# Patient Record
Sex: FEMALE | Race: OTHER | HISPANIC OR LATINO
[De-identification: names, ages, dates, MRNs, and addresses within clinical notes are randomized per-mention and may not be internally consistent; named-entity substitution may affect disease eponyms.]

---

## 2019-06-04 ENCOUNTER — RESULT REVIEW (OUTPATIENT)
Age: 54
End: 2019-06-04

## 2021-07-13 PROBLEM — Z00.00 ENCOUNTER FOR PREVENTIVE HEALTH EXAMINATION: Status: ACTIVE | Noted: 2021-07-13

## 2021-07-16 ENCOUNTER — NON-APPOINTMENT (OUTPATIENT)
Age: 56
End: 2021-07-16

## 2021-07-16 ENCOUNTER — APPOINTMENT (OUTPATIENT)
Dept: GASTROENTEROLOGY | Facility: CLINIC | Age: 56
End: 2021-07-16
Payer: COMMERCIAL

## 2021-07-16 VITALS
SYSTOLIC BLOOD PRESSURE: 128 MMHG | BODY MASS INDEX: 28.34 KG/M2 | TEMPERATURE: 97.4 F | DIASTOLIC BLOOD PRESSURE: 82 MMHG | HEIGHT: 62 IN | WEIGHT: 154 LBS

## 2021-07-16 DIAGNOSIS — K63.5 POLYP OF COLON: ICD-10-CM

## 2021-07-16 DIAGNOSIS — Z12.11 ENCOUNTER FOR SCREENING FOR MALIGNANT NEOPLASM OF COLON: ICD-10-CM

## 2021-07-16 DIAGNOSIS — Z86.39 PERSONAL HISTORY OF OTHER ENDOCRINE, NUTRITIONAL AND METABOLIC DISEASE: ICD-10-CM

## 2021-07-16 DIAGNOSIS — Z86.79 PERSONAL HISTORY OF OTHER DISEASES OF THE CIRCULATORY SYSTEM: ICD-10-CM

## 2021-07-16 DIAGNOSIS — Z78.9 OTHER SPECIFIED HEALTH STATUS: ICD-10-CM

## 2021-07-16 PROCEDURE — 99072 ADDL SUPL MATRL&STAF TM PHE: CPT

## 2021-07-16 PROCEDURE — 99204 OFFICE O/P NEW MOD 45 MIN: CPT

## 2021-07-16 RX ORDER — LOSARTAN POTASSIUM 100 MG/1
100 TABLET, FILM COATED ORAL
Refills: 0 | Status: ACTIVE | COMMUNITY

## 2021-07-16 RX ORDER — SODIUM SULFATE, POTASSIUM SULFATE, MAGNESIUM SULFATE 17.5; 3.13; 1.6 G/ML; G/ML; G/ML
17.5-3.13-1.6 SOLUTION, CONCENTRATE ORAL TWICE DAILY
Qty: 2 | Refills: 0 | Status: ACTIVE | COMMUNITY
Start: 2021-07-16 | End: 1900-01-01

## 2021-07-16 NOTE — ASSESSMENT
[FreeTextEntry1] : STEPHANIE SUAREZ was advised to undergo colonoscopy to which she agreed. The procedure will be performed in [facility] \par [facility] with the assistance of an anesthesiologist. The patient was given a Suprep preparation prescription and understood the \par procedure as it was explained to her. She was given a booklet distributed by the [employer]\par  Endoscopy explaining the procedure in detail and she understood the risks of the procedure not limited to infection, bleeding,\par perforation or non- diagnosis of colorectal cancer. She was advised that she could not drive home, if she chooses to\par  receive sedation.\par \par Further diagnostic and treatment recommendations will be based upon the procedure and any biopsies, if they are taken.\par \par Thank you for allowing me to participate in this [facility] health care.\par \par , Best personal regards -- Don\par

## 2021-07-16 NOTE — HISTORY OF PRESENT ILLNESS
[FreeTextEntry1] : She is an asymptomatic 55-year-old female referred for a screening colonoscopy.  She has a past history of colon polyps.  She denies a family history of colon cancer.  Her last colonoscopy was March 9, 2015 in which 2 polyps were removed\par \par Her  served as a

## 2021-07-16 NOTE — CONSULT LETTER
[Dear  ___] : Dear  [unfilled], [Consult Letter:] : I had the pleasure of evaluating your patient, [unfilled]. [( Thank you for referring [unfilled] for consultation for _____ )] : Thank you for referring [unfilled] for consultation for [unfilled] [Please see my note below.] : Please see my note below. [Consult Closing:] : Thank you very much for allowing me to participate in the care of this patient.  If you have any questions, please do not hesitate to contact me. [Sincerely,] : Sincerely, [FreeTextEntry3] : Don\par \par Christiano Arce MD\par

## 2021-07-28 ENCOUNTER — APPOINTMENT (OUTPATIENT)
Dept: GASTROENTEROLOGY | Facility: AMBULATORY SURGERY CENTER | Age: 56
End: 2021-07-28
Payer: COMMERCIAL

## 2021-07-28 PROCEDURE — 45380 COLONOSCOPY AND BIOPSY: CPT | Mod: 33

## 2024-05-06 ENCOUNTER — OUTPATIENT (OUTPATIENT)
Dept: OUTPATIENT SERVICES | Facility: HOSPITAL | Age: 59
LOS: 1 days | End: 2024-05-06
Payer: COMMERCIAL

## 2024-05-06 VITALS
HEART RATE: 71 BPM | RESPIRATION RATE: 12 BRPM | SYSTOLIC BLOOD PRESSURE: 134 MMHG | WEIGHT: 143.96 LBS | OXYGEN SATURATION: 99 % | HEIGHT: 62 IN | DIASTOLIC BLOOD PRESSURE: 86 MMHG | TEMPERATURE: 98 F

## 2024-05-06 DIAGNOSIS — N87.0 MILD CERVICAL DYSPLASIA: ICD-10-CM

## 2024-05-06 DIAGNOSIS — R87.810 CERVICAL HIGH RISK HUMAN PAPILLOMAVIRUS (HPV) DNA TEST POSITIVE: ICD-10-CM

## 2024-05-06 DIAGNOSIS — R87.610 ATYPICAL SQUAMOUS CELLS OF UNDETERMINED SIGNIFICANCE ON CYTOLOGIC SMEAR OF CERVIX (ASC-US): ICD-10-CM

## 2024-05-06 DIAGNOSIS — I10 ESSENTIAL (PRIMARY) HYPERTENSION: ICD-10-CM

## 2024-05-06 DIAGNOSIS — Z01.818 ENCOUNTER FOR OTHER PREPROCEDURAL EXAMINATION: ICD-10-CM

## 2024-05-06 LAB
ANION GAP SERPL CALC-SCNC: 11 MMOL/L — SIGNIFICANT CHANGE UP (ref 5–17)
BUN SERPL-MCNC: 13 MG/DL — SIGNIFICANT CHANGE UP (ref 7–23)
CALCIUM SERPL-MCNC: 9.3 MG/DL — SIGNIFICANT CHANGE UP (ref 8.4–10.5)
CHLORIDE SERPL-SCNC: 100 MMOL/L — SIGNIFICANT CHANGE UP (ref 96–108)
CO2 SERPL-SCNC: 30 MMOL/L — SIGNIFICANT CHANGE UP (ref 22–31)
CREAT SERPL-MCNC: 0.55 MG/DL — SIGNIFICANT CHANGE UP (ref 0.5–1.3)
EGFR: 106 ML/MIN/1.73M2 — SIGNIFICANT CHANGE UP
GLUCOSE SERPL-MCNC: 158 MG/DL — HIGH (ref 70–99)
HCT VFR BLD CALC: 39.5 % — SIGNIFICANT CHANGE UP (ref 34.5–45)
HGB BLD-MCNC: 13.1 G/DL — SIGNIFICANT CHANGE UP (ref 11.5–15.5)
MCHC RBC-ENTMCNC: 29 PG — SIGNIFICANT CHANGE UP (ref 27–34)
MCHC RBC-ENTMCNC: 33.2 GM/DL — SIGNIFICANT CHANGE UP (ref 32–36)
MCV RBC AUTO: 87.4 FL — SIGNIFICANT CHANGE UP (ref 80–100)
NRBC # BLD: 0 /100 WBCS — SIGNIFICANT CHANGE UP (ref 0–0)
PLATELET # BLD AUTO: 302 K/UL — SIGNIFICANT CHANGE UP (ref 150–400)
POTASSIUM SERPL-MCNC: 3.5 MMOL/L — SIGNIFICANT CHANGE UP (ref 3.5–5.3)
POTASSIUM SERPL-SCNC: 3.5 MMOL/L — SIGNIFICANT CHANGE UP (ref 3.5–5.3)
RBC # BLD: 4.52 M/UL — SIGNIFICANT CHANGE UP (ref 3.8–5.2)
RBC # FLD: 12.9 % — SIGNIFICANT CHANGE UP (ref 10.3–14.5)
SODIUM SERPL-SCNC: 141 MMOL/L — SIGNIFICANT CHANGE UP (ref 135–145)
WBC # BLD: 5.53 K/UL — SIGNIFICANT CHANGE UP (ref 3.8–10.5)
WBC # FLD AUTO: 5.53 K/UL — SIGNIFICANT CHANGE UP (ref 3.8–10.5)

## 2024-05-06 PROCEDURE — 85027 COMPLETE CBC AUTOMATED: CPT

## 2024-05-06 PROCEDURE — 80048 BASIC METABOLIC PNL TOTAL CA: CPT

## 2024-05-06 PROCEDURE — G0463: CPT

## 2024-05-06 RX ORDER — LOSARTAN POTASSIUM 100 MG/1
1 TABLET, FILM COATED ORAL
Refills: 0 | DISCHARGE

## 2024-05-06 RX ORDER — SODIUM CHLORIDE 9 MG/ML
3 INJECTION INTRAMUSCULAR; INTRAVENOUS; SUBCUTANEOUS EVERY 8 HOURS
Refills: 0 | Status: DISCONTINUED | OUTPATIENT
Start: 2024-05-16 | End: 2024-05-31

## 2024-05-06 RX ORDER — HYDROCHLOROTHIAZIDE 25 MG
1 TABLET ORAL
Refills: 0 | DISCHARGE

## 2024-05-06 RX ORDER — SODIUM CHLORIDE 9 MG/ML
1000 INJECTION, SOLUTION INTRAVENOUS
Refills: 0 | Status: DISCONTINUED | OUTPATIENT
Start: 2024-05-16 | End: 2024-05-31

## 2024-05-06 NOTE — H&P PST ADULT - PROBLEM SELECTOR PLAN 1
JAIME on 5/16/24.   CBC and BMP done today at PST.  Pre op instructions provided and all questions answered.

## 2024-05-06 NOTE — H&P PST ADULT - HISTORY OF PRESENT ILLNESS
58 year old female with PMH HTN, abnormal PAP s/p LEEP 2001, tubal ligation. She presents today to PST prior to scheduled LEEP on 5/16/24. Patient denies recent fever, chills, chest pain, SOB, palpitations, or recent exposure to COVID-19.

## 2024-05-06 NOTE — H&P PST ADULT - ASSESSMENT
Activity: Patient states she can walk 2-3 blocks and up a flight of stairs with no symptoms.     DASI: 8.23    Mallampati: 2    Dental: Denies loose teeth or dentures.

## 2024-05-16 ENCOUNTER — OUTPATIENT (OUTPATIENT)
Dept: OUTPATIENT SERVICES | Facility: HOSPITAL | Age: 59
LOS: 1 days | End: 2024-05-16
Payer: COMMERCIAL

## 2024-05-16 ENCOUNTER — TRANSCRIPTION ENCOUNTER (OUTPATIENT)
Age: 59
End: 2024-05-16

## 2024-05-16 VITALS
RESPIRATION RATE: 18 BRPM | OXYGEN SATURATION: 98 % | DIASTOLIC BLOOD PRESSURE: 91 MMHG | TEMPERATURE: 98 F | HEART RATE: 67 BPM | SYSTOLIC BLOOD PRESSURE: 153 MMHG

## 2024-05-16 VITALS
SYSTOLIC BLOOD PRESSURE: 134 MMHG | DIASTOLIC BLOOD PRESSURE: 86 MMHG | RESPIRATION RATE: 12 BRPM | TEMPERATURE: 98 F | HEIGHT: 62 IN | HEART RATE: 71 BPM | OXYGEN SATURATION: 99 % | WEIGHT: 143.96 LBS

## 2024-05-16 DIAGNOSIS — N87.0 MILD CERVICAL DYSPLASIA: ICD-10-CM

## 2024-05-16 DIAGNOSIS — R87.810 CERVICAL HIGH RISK HUMAN PAPILLOMAVIRUS (HPV) DNA TEST POSITIVE: ICD-10-CM

## 2024-05-16 DIAGNOSIS — R87.610 ATYPICAL SQUAMOUS CELLS OF UNDETERMINED SIGNIFICANCE ON CYTOLOGIC SMEAR OF CERVIX (ASC-US): ICD-10-CM

## 2024-05-16 PROCEDURE — 57522 CONIZATION OF CERVIX: CPT

## 2024-05-16 PROCEDURE — 88305 TISSUE EXAM BY PATHOLOGIST: CPT | Mod: 26

## 2024-05-16 PROCEDURE — 88305 TISSUE EXAM BY PATHOLOGIST: CPT

## 2024-05-16 RX ORDER — HYDROMORPHONE HYDROCHLORIDE 2 MG/ML
0.5 INJECTION INTRAMUSCULAR; INTRAVENOUS; SUBCUTANEOUS
Refills: 0 | Status: DISCONTINUED | OUTPATIENT
Start: 2024-05-16 | End: 2024-05-16

## 2024-05-16 RX ORDER — OXYCODONE HYDROCHLORIDE 5 MG/1
5 TABLET ORAL ONCE
Refills: 0 | Status: DISCONTINUED | OUTPATIENT
Start: 2024-05-16 | End: 2024-05-16

## 2024-05-16 RX ADMIN — SODIUM CHLORIDE 100 MILLILITER(S): 9 INJECTION, SOLUTION INTRAVENOUS at 13:53

## 2024-05-16 RX ADMIN — SODIUM CHLORIDE 3 MILLILITER(S): 9 INJECTION INTRAMUSCULAR; INTRAVENOUS; SUBCUTANEOUS at 13:41

## 2024-05-16 NOTE — ASU DISCHARGE PLAN (ADULT/PEDIATRIC) - NURSING INSTRUCTIONS
You can take Tylenol (Acetaminophen) every 6 hours and Motrin (Ibuprofen) every 6 hours, as needed. You may alternate these medications such that you take one or the other every 3 hours for around the clock pain coverage. Do not exceed 4000mg of Tylenol (Acetaminophen) daily. The next time you can take Tylenol is at 9:30PM and the next time you can take Ibuprofen is at 10:30PM, if needed for pain and no contraindications.

## 2024-05-16 NOTE — ASU DISCHARGE PLAN (ADULT/PEDIATRIC) - ACTIVITY LEVEL
Aleida Fine was seen today for diabetes. Diagnoses and all orders for this visit:    Type 2 diabetes mellitus without complication, without long-term current use of insulin ([facility])  -     Good control. 6.4 last time. -     Continue meds and lifestyle control. General weakness  Try adding a snack of greek yogurt or cottage cheese and fruit for protein. Loss of balance  No falls. Continue therapy. Vitamin B12 deficiency  Continue 1000 MCG/day. Gastro-esophageal reflux disease without esophagitis  May decrease in the future. Acquired hypothyroidism  -     Good control.  -     Continue meds and lifestyle control. Mixed hyperlipidemia  -     Good control.  -     Continue meds and lifestyle control. Triglycerides are high. The diet to decrease triglycerides is:  Avoid sweets and soft drinks containing sugar, limit starches/carbs to the amount of calories you are burning, avoid fatty foods and added fats such as mayonnaise, salad dressings with oil, gravies and fried foods. If you drink alcohol limit to 1 -2 drinks per day. Both exercise and weight loss help also. These can also help raise the HDL to the goal of 50 or more. Diarrhea  Can take the Zinc 50 mg twice daily with meals IF diarrhea.
No heavy lifting/Nothing per vagina/No tub baths/No douching/No tampons/No intercourse

## 2024-05-16 NOTE — ASU PATIENT PROFILE, ADULT - AS [facility] NUTRITION
Airway patent, Nasal mucosa clear. Mouth with normal mucosa. Throat has no vesicles, no oropharyngeal exudates and uvula is midline. (3) adequate

## 2024-05-16 NOTE — BRIEF OPERATIVE NOTE - OPERATION/FINDINGS
cervix grossly appeared normal, Transformation Zone not visualized.  no acetowhite epithelium seen, narrow cervical os, unable to pass endocervical currette

## 2024-05-16 NOTE — ASU PATIENT PROFILE, ADULT - TEACHING/LEARNING OTHER LEARNERS
Pulmonary Hypertension  Pulmonary hypertension is a long-term (chronic) condition in which there is high blood pressure in the arteries in the lungs (pulmonary arteries). This condition occurs when pulmonary arteries become narrow and tight, making it harder for blood to flow through the lungs. This in turn makes the heart work harder to pump blood through the lungs, making it harder for you to breathe.  Over time, pulmonary hypertension can weaken and damage the heart muscle, specifically the right side of the heart. Pulmonary hypertension is a serious condition that can be life-threatening.  What are the causes?  This condition may be caused by different medical conditions. It can be categorized by cause into five groups:  · Group 1: Pulmonary hypertension that is caused by abnormal growth of small blood vessels in the lungs (pulmonary arterial hypertension). The abnormal blood vessel growth may have no known cause, or it may be:  ? Passed from parent to child (hereditary).  ? Caused by another disease, such as a connective tissue disease (including lupus or scleroderma), congenital heart disease, liver disease, or HIV.  ? Caused by certain medicines or poisons (toxins).  · Group 2: Pulmonary hypertension that is caused by weakness of the left chamber of the heart (left ventricle) or heart valve disease.  · Group 3: Pulmonary hypertension that is caused by lung disease or low oxygen levels. Causes in this group include:  ? Emphysema or chronic obstructive pulmonary disease (COPD).  ? Untreated sleep apnea.  ? Pulmonary fibrosis.  ? Long-term exposure to high altitudes in certain people who may already be at higher risk for pulmonary hypertension.  · Group 4: Pulmonary hypertension that is caused by blood clots in the lungs (pulmonary emboli).  · Group 5: Other causes of pulmonary hypertension, such as sickle cell anemia, sarcoidosis, tumors pressing on the pulmonary arteries, and various other diseases.  What are  the signs or symptoms?  Symptoms of this condition include:  · Shortness of breath. You may notice shortness of breath with:  ? Activity, such as walking.  ? Minimal activity, such as getting dressed.  ? No activity, like when you are sitting still.  · A cough. Sometimes, bloody mucus from the lungs may be coughed up (hemoptysis).  · Tiredness and fatigue.  · Dizziness, lightheadedness, or fainting, especially with physical activity.  · Rapid heartbeat, or feeling your heart flutter or skip a beat (palpitations).  · Veins in the neck getting larger.  · Swelling of the lower legs, abdomen, or both.  · Bluish color of the lips and fingertips.  · Chest pain or tightness in the chest.  · Abdominal pain, especially in the upper abdomen.  How is this diagnosed?  This condition may be diagnosed based on one or more of the following tests:  · Chest X-ray.  · Blood tests.  · CT scan.  · Pulmonary function test. This test measures how much air your lungs can hold. It also tests how well air moves in and out of your lungs.  · 6-minute walk test. This tests how severe your condition is in relation to your activity levels.  · Electrocardiogram (ECG). This test records the electrical impulses of the heart.  · Echocardiogram. This test uses sound waves (ultrasound) to produce an image of the heart.  · Cardiac catheterization. This is a procedure in which a thin tube (catheter) is passed into the pulmonary artery and used to test the pressure in your pulmonary artery and the right side of your heart.  · Lung biopsy. This involves having a procedure to remove a small sample of lung tissue for testing. This may help determine an underlying cause of your pulmonary hypertension.  How is this treated?  There is no cure for this condition, but treatment can help to relieve symptoms and slow the progress of the condition. Treatment may include:  · Cardiac rehabilitation. This is a treatment program that includes exercise training,  education, and counseling to help you get stronger and return to an active lifestyle.  · Oxygen therapy.  · Medicines that:  ? Lower blood pressure.  ? Relax (dilate) the pulmonary blood vessels.  ? Help the heart beat more efficiently and pump more blood.  ? Help the body get rid of extra fluid (diuretics).  ? Thin the blood in order to prevent blood clots in the lungs.  · Lung surgery to relieve pressure on the heart, for severe cases that do not respond to medical treatment.  · Heart-lung transplant, or lung transplant. This may be done in very severe cases.  Follow these instructions at home:  Eating and drinking    · Eat a healthy diet that includes plenty of fresh fruits and vegetables, whole grains, and beans.  · Limit your salt (sodium) intake to less than 2,300 mg a day.  Lifestyle  · Do not use any products that contain nicotine or tobacco, such as cigarettes and e-cigarettes. If you need help quitting, ask your health care provider.  · Avoid secondhand smoke.  Activity  · Get plenty of rest.  · Exercise as directed. Talk with your health care provider about what type of exercise is safe for you.  · Avoid hot tubs and saunas.  · Avoid high altitudes.  General instructions  · Take over-the-counter and prescription medicines only as told by your health care provider. Do not change or stop medicines without checking with your health care provider.  · Stay up to date on your vaccines, especially yearly flu (influenza) and pneumonia vaccines.  · If you are a woman of child-bearing age, avoid becoming pregnant. Talk with your health care provider about birth control.  · Consider ways to get support for anxiety and stress of living with pulmonary hypertension. Talk with your health care provider about support groups and online resources.  · Use oxygen therapy at home as directed.  · Keep track of your weight. Weight gain could be a sign that your condition is getting worse.  · Keep all follow-up visits as told by  your health care provider. This is important.  Contact a health care provider if:  · Your cough gets worse.  · You have more shortness of breath than usual, or you start to have trouble doing activities that you could do before.  · You need to use medicines or oxygen more frequently or in higher dosages than usual.  Get help right away if:  · You have severe shortness of breath.  · You have chest pain or pressure.  · You cough up blood.  · You have swelling of your feet or legs that gets worse.  · You have rapid weight gain over a period of 1-2 days.  · Your medicines or oxygen do not provide relief.  Summary  · Pulmonary hypertension is a chronic condition in which there is high blood pressure in the arteries in the lungs (pulmonary arteries).  · Pulmonary hypertension is a serious condition that can be life-threatening. It can be caused by a variety of illnesses.  · Treatment may involve taking medicines and using oxygen therapy. Severe cases may require surgery or a transplant.  This information is not intended to replace advice given to you by your health care provider. Make sure you discuss any questions you have with your health care provider.  Document Released: 10/14/2008 Document Revised: 11/30/2018 Document Reviewed: 03/13/2018  [employer] Patient Education © 2020 [employer]      Steps to Quit Smoking  Smoking tobacco is the leading cause of preventable death. It can affect almost every organ in the body. Smoking puts you and those around you at risk for developing many serious chronic diseases. Quitting smoking can be difficult, but it is one of the best things that you can do for your health. It is never too late to quit.  How do I get ready to quit?  When you decide to quit smoking, create a plan to help you succeed. Before you quit:  · Pick a date to quit. Set a date within the next 2 weeks to give you time to prepare.  · Write down the reasons why you are quitting. Keep this list in places where you  will see it often.  · Tell your family, friends, and co-workers that you are quitting. Support from your loved ones can make quitting easier.  · Talk with your health care provider about your options for quitting smoking.  · Find out what treatment options are covered by your health insurance.  · Identify people, places, things, and activities that make you want to smoke (triggers). Avoid them.  What first steps can I take to quit smoking?  · Throw away all cigarettes at home, at work, and in your car.  · Throw away smoking accessories, such as ashtrays and lighters.  · Clean your car. Make sure to empty the ashtray.  · Clean your home, including curtains and carpets.  What strategies can I use to quit smoking?  Talk with your health care provider about combining strategies, such as taking medicines while you are also receiving in-person counseling. Using these two strategies together makes you more likely to succeed in quitting than if you used either strategy on its own.  · If you are pregnant or breastfeeding, talk with your health care provider about finding counseling or other support strategies to quit smoking. Do not take medicine to help you quit smoking unless your health care provider tells you to do so.  To quit smoking:  Quit right away  · Quit smoking completely, instead of gradually reducing how much you smoke over a period of time. Research shows that stopping smoking right away is more successful than gradually quitting.  · Attend in-person counseling to help you build problem-solving skills. You are more likely to succeed in quitting if you attend counseling sessions regularly. Even short sessions of 10 minutes can be effective.  Take medicine  You may take medicines to help you quit smoking. Some medicines require a prescription and some you can purchase over-the-counter. Medicines may have nicotine in them to replace the nicotine in cigarettes. Medicines may:  · Help to stop cravings.  · Help to  relieve withdrawal symptoms.  Your health care provider may recommend:  · Nicotine patches, gum, or lozenges.  · Nicotine inhalers or sprays.  · Non-nicotine medicine that is taken by mouth.  Find resources  Find resources and support systems that can help you to quit smoking and remain smoke-free after you quit. These resources are most helpful when you use them often. They include:  · Online chats with a counselor.  · Telephone quitlines.  · Printed self-help materials.  · Support groups or group counseling.  · Text messaging programs.  · Mobile phone apps or applications. Use apps that can help you stick to your quit plan by providing reminders, tips, and encouragement. There are many free apps for mobile devices as well as websites. Examples include Quit Guide from the CDC and smokefree.gov  What things can I do to make it easier to quit?    · Reach out to your family and friends for support and encouragement. Call telephone quitlines (0-212-QUIT-NOW), reach out to support groups, or work with a counselor for support.  · Ask people who smoke to avoid smoking around you.  · Avoid places that trigger you to smoke, such as bars, parties, or smoke-break areas at work.  · Spend time with people who do not smoke.  · Lessen the stress in your life. Stress can be a smoking trigger for some people. To lessen stress, try:  ? Exercising regularly.  ? Doing deep-breathing exercises.  ? Doing yoga.  ? Meditating.  ? Performing a body scan. This involves closing your eyes, scanning your body from head to toe, and noticing which parts of your body are particularly tense. Try to relax the muscles in those areas.  How will I feel when I quit smoking?  Day 1 to 3 weeks  Within the first 24 hours of quitting smoking, you may start to feel withdrawal symptoms. These symptoms are usually most noticeable 2-3 days after quitting, but they usually do not last for more than 2-3 weeks. You may experience these symptoms:  · Mood  swings.  · Restlessness, anxiety, or irritability.  · Trouble concentrating.  · Dizziness.  · Strong cravings for sugary foods and nicotine.  · Mild weight gain.  · Constipation.  · Nausea.  · Coughing or a sore throat.  · Changes in how the medicines that you take for unrelated issues work in your body.  · Depression.  · Trouble sleeping (insomnia).  Week 3 and afterward  After the first 2-3 weeks of quitting, you may start to notice more positive results, such as:  · Improved sense of smell and taste.  · Decreased coughing and sore throat.  · Slower heart rate.  · Lower blood pressure.  · Clearer skin.  · The ability to breathe more easily.  · Fewer sick days.  Quitting smoking can be very challenging. Do not get discouraged if you are not successful the first time. Some people need to make many attempts to quit before they achieve long-term success. Do your best to stick to your quit plan, and talk with your health care provider if you have any questions or concerns.  Summary  · Smoking tobacco is the leading cause of preventable death. Quitting smoking is one of the best things that you can do for your health.  · When you decide to quit smoking, create a plan to help you succeed.  · Quit smoking right away, not slowly over a period of time.  · When you start quitting, seek help from your health care provider, family, or friends.  This information is not intended to replace advice given to you by your health care provider. Make sure you discuss any questions you have with your health care provider.  Document Released: 12/12/2002 Document Revised: 03/06/2020 Document Reviewed: 03/07/2020  [employer] Patient Education © 2020 [employer]      Pulmonary Function Tests  Pulmonary function tests (PFTs) are used to measure how well your lungs work, find out what is causing your lung problems, and figure out the best treatment for you. You may have PFTs:  · When you have an illness involving the lungs.  · To follow  changes in your lung function over time if you have a chronic lung disease.  · If you are an industrial . This checks the effects of being exposed to chemicals over a long period of time.  · To check lung function before having surgery or other procedures.  · To check your lungs if you smoke.  · To check if prescribed medicines or treatments are helping your lungs.  Your results will be compared to the expected lung function of someone with healthy lungs who is similar to you in:  · Age.  · Gender.  · Height.  · Weight.  · Race or ethnicity.  This is done to show how your lungs compare to normal lung function (percent predicted). This is how your health care provider knows if your lung function is normal or not. If you have had PFTs done before, your health care provider will compare your current results with past results. This shows if your lung function is better, worse, or the same as before.  Tell a health care provider about:  · Any allergies you have.  · All medicines you are taking, including inhaler or nebulizer medicines, vitamins, herbs, eye drops, creams, and over-the-counter medicines.  · Any blood disorders you have.  · Any surgeries you have had, especially recent eye surgery, abdominal surgery, or chest surgery. These can make PFTs difficult or unsafe.  · Any medical conditions you have, including chest pain or heart problems, tuberculosis, or respiratory infections such as pneumonia, a cold, or the flu.  · Any fear of being in closed spaces (claustrophobia). Some of your tests may be in a closed space.  What are the risks?  Generally, this is a safe procedure. However, problems may occur, including:  · Light-headedness due to over-breathing (hyperventilation).  · An asthma attack from deep breathing.  · A collapsed lung.  What happens before the procedure?  · Take over-the-counter and prescription medicines only as told by your health care provider. If you take inhaler or nebulizer  medicines, ask your health care provider which medicines you should take on the day of your testing. Some inhaler medicines may interfere with PFTs if they are taken shortly before the tests.  · Follow your health care provider's instructions on eating and drinking restrictions. This may include avoiding eating large meals and drinking alcohol before the testing.  · Do not use any products that contain nicotine or tobacco, such as cigarettes and e-cigarettes. If you need help quitting, ask your health care provider.  · Wear comfortable clothing that will not interfere with breathing.  What happens during the procedure?    · You will be given a soft nose clip to wear. This is done so all of your breaths will go through your mouth instead of your nose.  · You will be given a germ-free (sterile) mouthpiece. It will be attached to a machine that measures your breathing (spirometer).  · You will be asked to do various breathing maneuvers. The maneuvers will be done by breathing in (inhaling) and breathing out (exhaling). You may be asked to repeat the maneuvers several times before the testing is done.  · It is important to follow the instructions exactly to get accurate results. Make sure to blow as hard and as fast as you can when you are told to do so.  · You may be given a medicine that makes the small air passages in your lungs larger (bronchodilator) after testing has been done. This medicine will make it easier for you to breathe.  · The tests will be repeated after the bronchodilator has taken effect.  · You will be monitored carefully during the procedure for faintness, dizziness, trouble breathing, or any other problems.  The procedure may vary among health care providers and hospitals.  What happens after the procedure?  · It is up to you to get your test results. Ask your health care provider, or the department that is doing the tests, when your results will be ready. After you have received your test  results, talk with your health care provider about treatment options, if necessary.  Summary  · Pulmonary function tests (PFTs) are used to measure how well your lungs work, find out what is causing your lung problems, and figure out the best treatment for you.  · Wear comfortable clothing that will not interfere with breathing.  · It is up to you to get your test results. After you have received them, talk with your health care provider about treatment options, if necessary.  This information is not intended to replace advice given to you by your health care provider. Make sure you discuss any questions you have with your health care provider.  Document Released: 08/10/2005 Document Revised: 12/15/2017 Document Reviewed: 11/09/2017  [employer] Patient Education © 2020 [employer]      Pulmonary Artery Catheterization    Pulmonary artery catheterization is a procedure used to test blood movement through the heart and to monitor the heart's function. In this procedure, a thin, flexible tube (catheter) is passed into the right side of the heart and into the main artery that carries blood from your heart to your lungs (pulmonary artery). The procedure may be used during heart or blood vessel surgery, during cardiac catheterization procedures, or to monitor serious conditions in the intensive care unit.  Tell a health care provider about:  · Any allergies you have.  · All medicines you are taking, including vitamins, herbs, eye drops, creams, and over-the-counter medicines.  · Any problems you or family members have had with anesthetic medicines.  · Any blood disorders you have.  · Any surgeries you have had.  · Any medical conditions you have.  · Whether you are pregnant or may be pregnant.  What are the risks?  Generally, this is a safe procedure. However, problems may occur, including:  · Bruising or bleeding at the catheter insertion site.  · Injury to the vein where the catheter was inserted.  · Lung puncture. This  is a risk if neck or chest veins are used.  · Infection.  The following problems may also occur, but they are rare:  · Abnormal heart rhythms.  · Low blood pressure.  · Fluid buildup around the heart.  · Blocked blood vessel caused by a blood clot (embolism).  What happens before the procedure?  Medicines  Ask your health care provider about:  · Changing or stopping your regular medicines. This is especially important if you are taking diabetes medicines or blood thinners.  · Taking medicines such as aspirin and ibuprofen. These medicines can thin your blood. Do not take these medicines unless your health care provider tells you to take them.  · Taking over-the-counter medicines, vitamins, herbs, and supplements.  General instructions  · Follow instructions from your health care provider about eating or drinking restrictions.  · Plan to have someone take you home from the hospital or clinic.  · Ask your health care provider what steps will be taken to help prevent infection. These may include:  ? Removing hair at the surgery site.  ? Washing skin with a germ-killing soap.  ? Antibiotic medicine.  What happens during the procedure?    · An IV will be inserted into one of your veins.  · You will be given one or both of the following:  ? A medicine to help you relax (sedative).  ? A medicine to numb the area (local anesthetic).  · A small incision will be made in a vein in the insertion area.  · A catheter will be inserted through the incision and into the vein. The health care provider will carefully move the catheter into the upper chamber of the heart (right atrium). X-rays may be used to help guide the catheter to the right place.  · The catheter will be threaded through two heart valves (tricuspid valve and pulmonary valve) and placed into the pulmonary artery.  · Blood pressure in the pulmonary artery will be measured as soon as the catheter is in place.  · During the procedure, your heart's rhythm will be  watched constantly using an electrocardiogram (ECG).  · The catheter will be removed after tests and monitoring have been completed.  The procedure may vary among health care providers and hospitals.  What happens after the procedure?  · Your blood pressure, heart rate, breathing rate, and blood oxygen level will be monitored until you leave the hospital or clinic.  Summary  · Pulmonary artery catheterization is a procedure that is used to test blood movement through the heart and to monitor the heart's function.  · The procedure may be used during heart or blood vessel surgery, during cardiac catheterization procedures, or to monitor serious conditions in the intensive care unit.  · A thin, flexible tube (catheter) is passed into the right side of the heart and into the main artery that carries blood from your heart to your lungs (pulmonary artery).  This information is not intended to replace advice given to you by your health care provider. Make sure you discuss any questions you have with your health care provider.  Document Released: 04/22/2008 Document Revised: 02/21/2019 Document Reviewed: 01/23/2019  [employer] Patient Education © 2020 [employer]      Coronary Angiogram  A coronary angiogram is an X-ray procedure that is used to examine the arteries in the heart. In this procedure, a dye (contrast dye) is injected through a long, thin tube (catheter). The catheter is inserted through the groin, wrist, or arm. The dye is injected into each artery, then X-rays are taken to show if there is a blockage in the arteries of the heart. This procedure can also show if you have valve disease or a disease of the aorta, and it can be used to check the overall function of your heart muscle. You may have a coronary angiogram if:  · You are having chest pain, or other symptoms of angina, and you are at risk for heart disease.  · You have an abnormal electrocardiogram (ECG) or stress test.  · You have chest pain and heart  failure.  · You are having irregular heart rhythms.  · You and your health care provider determine that the benefits of the test information outweigh the risks of the procedure.  Let your health care provider know about:  · Any allergies you have, including allergies to contrast dye.  · All medicines you are taking, including vitamins, herbs, eye drops, creams, and over-the-counter medicines.  · Any problems you or family members have had with anesthetic medicines.  · Any blood disorders you have.  · Any surgeries you have had.  · History of kidney problems or kidney failure.  · Any medical conditions you have.  · Whether you are pregnant or may be pregnant.  What are the risks?  Generally, this is a safe procedure. However, problems may occur, including:  · Infection.  · Allergic reaction to medicines or dyes that are used.  · Bleeding from the access site or other locations.  · Kidney injury, especially in people with impaired kidney function.  · Stroke (rare).  · Heart attack (rare).  · Damage to other structures or organs.  What happens before the procedure?  Staying hydrated  Follow instructions from your health care provider about hydration, which may include:  · Up to 2 hours before the procedure - you may continue to drink clear liquids, such as water, clear fruit juice, black coffee, and plain tea.  Eating and drinking restrictions  Follow instructions from your health care provider about eating and drinking, which may include:  · 8 hours before the procedure - stop eating heavy meals or foods such as meat, fried foods, or fatty foods.  · 6 hours before the procedure - stop eating light meals or foods, such as toast or cereal.  · 2 hours before the procedure - stop drinking clear liquids.  General instructions  · Ask your health care provider about:  ? Changing or stopping your regular medicines. This is especially important if you are taking diabetes medicines or blood thinners.  ? Taking medicines such as  ibuprofen. These medicines can thin your blood. Do not take these medicines before your procedure if your health care provider instructs you not to, though aspirin may be recommended prior to coronary angiograms.  · Plan to have someone take you home from the hospital or clinic.  · You may need to have blood tests or X-rays done.  What happens during the procedure?  · An IV tube will be inserted into one of your veins.  · You will be given one or more of the following:  ? A medicine to help you relax (sedative).  ? A medicine to numb the area where the catheter will be inserted into an artery (local anesthetic).  · To reduce your risk of infection:  ? Your health care team will wash or sanitize their hands.  ? Your skin will be washed with soap.  ? Hair may be removed from the area where the catheter will be inserted.  · You will be connected to a continuous ECG monitor.  · The catheter will be inserted into an artery. The location may be in your groin, in your wrist, or in the fold of your arm (near your elbow).  · A type of X-ray (fluoroscopy) will be used to help guide the catheter to the opening of the blood vessel that is being examined.  · A dye will be injected into the catheter, and X-rays will be taken. The dye will help to show where any narrowing or blockages are located in the heart arteries.  · Tell your health care provider if you have any chest pain or trouble breathing during the procedure.  · If blockages are found, your health care provider may perform another procedure, such as inserting a coronary stent.  The procedure may vary among health care providers and hospitals.  What happens after the procedure?  · After the procedure, you will need to keep the area still for a few hours, or for as long as told by your health care provider. If the procedure is done through the groin, you will be instructed to not bend and not cross your legs.  · The insertion site will be checked frequently.  · The pulse  in your foot or wrist will be checked frequently.  · You may have additional blood tests, X-rays, and a test that records the electrical activity of your heart (ECG).  · Do not drive for 24 hours if you were given a sedative.  Summary  · A coronary angiogram is an X-ray procedure that is used to look into the arteries in the heart.  · During the procedure, a dye (contrast dye) is injected through a long, thin tube (catheter). The catheter is inserted through the groin, wrist, or arm.  · Tell your health care provider about any allergies you have, including allergies to contrast dye.  · After the procedure, you will need to keep the area still for a few hours, or for as long as told by your health care provider.  This information is not intended to replace advice given to you by your health care provider. Make sure you discuss any questions you have with your health care provider.  Document Released: 06/23/2004 Document Revised: 11/30/2018 Document Reviewed: 09/29/2017  [employer] Patient Education © 2020 [employer]     spouse

## 2024-05-16 NOTE — ASU PATIENT PROFILE, ADULT - FALL HARM RISK - UNIVERSAL INTERVENTIONS
Bed in lowest position, wheels locked, appropriate side rails in place/Call bell, personal items and telephone in reach/Instruct patient to call for assistance before getting out of bed or chair/Non-slip footwear when patient is out of bed/[employer] to call system/Physically safe environment - no spills, clutter or unnecessary equipment/Purposeful Proactive Rounding/Room/bathroom lighting operational, light cord in reach

## 2024-05-28 LAB — SURGICAL PATHOLOGY STUDY: SIGNIFICANT CHANGE UP

## 2024-10-22 PROBLEM — B97.7 HIGH RISK HPV INFECTION: Status: ACTIVE | Noted: 2024-10-22

## 2024-10-23 ENCOUNTER — NON-APPOINTMENT (OUTPATIENT)
Age: 59
End: 2024-10-23

## 2024-10-23 ENCOUNTER — APPOINTMENT (OUTPATIENT)
Dept: GYNECOLOGIC ONCOLOGY | Facility: CLINIC | Age: 59
End: 2024-10-23

## 2024-10-23 VITALS
HEART RATE: 65 BPM | HEIGHT: 62 IN | TEMPERATURE: 97.7 F | SYSTOLIC BLOOD PRESSURE: 154 MMHG | BODY MASS INDEX: 27.23 KG/M2 | OXYGEN SATURATION: 96 % | DIASTOLIC BLOOD PRESSURE: 93 MMHG | WEIGHT: 148 LBS | RESPIRATION RATE: 16 BRPM

## 2024-10-23 DIAGNOSIS — B97.7 PAPILLOMAVIRUS AS THE CAUSE OF DISEASES CLASSIFIED ELSEWHERE: ICD-10-CM

## 2024-10-23 DIAGNOSIS — R87.619 UNSPECIFIED ABNORMAL CYTOLOGICAL FINDINGS IN SPECIMENS FROM CERVIX UTERI: ICD-10-CM

## 2024-10-23 DIAGNOSIS — Z98.890 OTHER SPECIFIED POSTPROCEDURAL STATES: ICD-10-CM

## 2024-10-23 PROCEDURE — 99459 PELVIC EXAMINATION: CPT

## 2024-10-23 PROCEDURE — 99205 OFFICE O/P NEW HI 60 MIN: CPT

## (undated) DEVICE — APPLICATOR Q TIP 6" WOOD STEM

## (undated) DEVICE — SPECIMEN CONTAINER 100ML

## (undated) DEVICE — ELCTR BOVIE TIP CLEANER SCRATCH PAD

## (undated) DEVICE — WARMING BLANKET UPPER ADULT

## (undated) DEVICE — ELCTR E/S BALL MED 3MMX13CM

## (undated) DEVICE — ELCTR BOVIE PENCIL HANDPIECE

## (undated) DEVICE — ELCTR LOOP FOR LLETZ 20MM X 15MM

## (undated) DEVICE — DRAPE LIGHT HANDLE COVER (GREEN)

## (undated) DEVICE — TUBING SUCTION 20FT

## (undated) DEVICE — SOL INJ NS 0.9% 500ML 1-PORT

## (undated) DEVICE — GLV 6.5 PROTEXIS (WHITE)

## (undated) DEVICE — POSITIONER FOAM EGG CRATE ULNAR 2PCS (PINK)

## (undated) DEVICE — OS FINDER

## (undated) DEVICE — NDL SPINAL 22G X 3.5" (BLACK)

## (undated) DEVICE — SYR LUER LOK 10CC

## (undated) DEVICE — ELCTR BALL LEEP 5MM

## (undated) DEVICE — SOL IRR POUR NS 0.9% 500ML

## (undated) DEVICE — PACK LITHOTOMY

## (undated) DEVICE — NDL COUNTER FOAM AND MAGNET 15-30

## (undated) DEVICE — APPLICATOR RAYON PROCTO SWAB 16"

## (undated) DEVICE — ELCTR LOOP FOR LLETZ 10MM

## (undated) DEVICE — ELCTR BALL LLETZ LG 5MM

## (undated) DEVICE — GLV 7 PROTEXIS (WHITE)

## (undated) DEVICE — POSITIONER PATIENT SAFETY STRAP 3X60"

## (undated) DEVICE — SOL IRR POUR NS 0.9% 1500ML

## (undated) DEVICE — DRAPE INSTRUMENT POUCH 6.75" X 11"

## (undated) DEVICE — ELCTR LOOP FOR LLETZ 15MM X 12MM